# Patient Record
Sex: MALE | URBAN - METROPOLITAN AREA
[De-identification: names, ages, dates, MRNs, and addresses within clinical notes are randomized per-mention and may not be internally consistent; named-entity substitution may affect disease eponyms.]

---

## 2021-08-30 ENCOUNTER — APPOINTMENT (RX ONLY)
Dept: URBAN - METROPOLITAN AREA CLINIC 35 | Facility: CLINIC | Age: 36
Setting detail: DERMATOLOGY
End: 2021-08-30

## 2021-08-30 DIAGNOSIS — E85.4 ORGAN-LIMITED AMYLOIDOSIS: ICD-10-CM | Status: INADEQUATELY CONTROLLED

## 2021-08-30 PROCEDURE — ? DIAGNOSIS COMMENT

## 2021-08-30 PROCEDURE — ? PRESCRIPTION

## 2021-08-30 PROCEDURE — 99203 OFFICE O/P NEW LOW 30 MIN: CPT

## 2021-08-30 PROCEDURE — ? COUNSELING

## 2021-08-30 PROCEDURE — ? PRESCRIPTION MEDICATION MANAGEMENT

## 2021-08-30 RX ORDER — TRIAMCINOLONE ACETONIDE 1 MG/G
CREAM TOPICAL BID
Qty: 45 | Refills: 1 | Status: ERX | COMMUNITY
Start: 2021-08-30

## 2021-08-30 RX ORDER — CLOBETASOL PROPIONATE 0.5 MG/G
CREAM TOPICAL BID
Qty: 45 | Refills: 1 | Status: ERX | COMMUNITY
Start: 2021-08-30

## 2021-08-30 RX ADMIN — TRIAMCINOLONE ACETONIDE: 1 CREAM TOPICAL at 00:00

## 2021-08-30 RX ADMIN — CLOBETASOL PROPIONATE: 0.5 CREAM TOPICAL at 00:00

## 2021-08-30 ASSESSMENT — LOCATION ZONE DERM
LOCATION ZONE: NECK
LOCATION ZONE: LEG

## 2021-08-30 ASSESSMENT — LOCATION DETAILED DESCRIPTION DERM
LOCATION DETAILED: LEFT DISTAL PRETIBIAL REGION
LOCATION DETAILED: RIGHT PROXIMAL PRETIBIAL REGION
LOCATION DETAILED: LEFT INFERIOR POSTERIOR NECK

## 2021-08-30 ASSESSMENT — LOCATION SIMPLE DESCRIPTION DERM
LOCATION SIMPLE: LEFT PRETIBIAL REGION
LOCATION SIMPLE: RIGHT PRETIBIAL REGION
LOCATION SIMPLE: POSTERIOR NECK

## 2021-08-30 NOTE — PROCEDURE: DIAGNOSIS COMMENT
Comment: No involvement of tongue or throat
Detail Level: Simple
Render Risk Assessment In Note?: no

## 2021-08-30 NOTE — PROCEDURE: PRESCRIPTION MEDICATION MANAGEMENT
Samples Given: CeraVe Anti Itch cream
Detail Level: Zone
Render In Strict Bullet Format?: No
Initiate Treatment: Clobetasol 0.05% cream BID x 2 weeks then triamcinolone 0.1% cream BID x 2 weeks

## 2021-11-03 ENCOUNTER — APPOINTMENT (RX ONLY)
Dept: URBAN - METROPOLITAN AREA CLINIC 35 | Facility: CLINIC | Age: 36
Setting detail: DERMATOLOGY
End: 2021-11-03

## 2021-11-03 DIAGNOSIS — E85.4 ORGAN-LIMITED AMYLOIDOSIS: ICD-10-CM | Status: IMPROVED

## 2021-11-03 PROCEDURE — 99213 OFFICE O/P EST LOW 20 MIN: CPT

## 2021-11-03 PROCEDURE — ? PRESCRIPTION

## 2021-11-03 PROCEDURE — ? PRESCRIPTION MEDICATION MANAGEMENT

## 2021-11-03 PROCEDURE — ? COUNSELING

## 2021-11-03 RX ORDER — CLOBETASOL PROPIONATE 0.5 MG/G
CREAM TOPICAL BID
Qty: 45 | Refills: 1 | Status: ERX

## 2021-11-03 RX ORDER — PIMECROLIMUS 10 MG/G
CREAM TOPICAL
Qty: 60 | Refills: 1 | Status: ERX | COMMUNITY
Start: 2021-11-03

## 2021-11-03 RX ORDER — TRIAMCINOLONE ACETONIDE 1 MG/G
CREAM TOPICAL BID
Qty: 45 | Refills: 1 | Status: ERX

## 2021-11-03 RX ADMIN — PIMECROLIMUS: 10 CREAM TOPICAL at 00:00

## 2021-11-03 ASSESSMENT — LOCATION DETAILED DESCRIPTION DERM
LOCATION DETAILED: LEFT INFERIOR POSTERIOR NECK
LOCATION DETAILED: RIGHT PROXIMAL PRETIBIAL REGION
LOCATION DETAILED: LEFT DISTAL PRETIBIAL REGION

## 2021-11-03 ASSESSMENT — LOCATION SIMPLE DESCRIPTION DERM
LOCATION SIMPLE: LEFT PRETIBIAL REGION
LOCATION SIMPLE: POSTERIOR NECK
LOCATION SIMPLE: RIGHT PRETIBIAL REGION

## 2021-11-03 ASSESSMENT — LOCATION ZONE DERM
LOCATION ZONE: LEG
LOCATION ZONE: NECK

## 2021-11-03 NOTE — PROCEDURE: PRESCRIPTION MEDICATION MANAGEMENT
Continue Regimen: For severe itching: Clobetasol 0.05% cream BID x 2-3 weeks at a time then repeat PRN\\n\\nFor moderate itching uncontrolled with Elidel: triamcinolone 0.1% cream BID x 2 weeks and repeat regimen as needed PRN
Detail Level: Zone
Render In Strict Bullet Format?: No
Initiate Treatment: -Moisturize with aveeno eczema, CeraVe itch relief cream. Can place moisturizers in fridge.\\n\\n-Elidel BID every day prn itch

## 2022-05-17 NOTE — PROCEDURE: COUNSELING
Detail Level: Simple Pt. with CKD stage 4 in the setting of likely Pt. with CKD stage 4 in the setting of likely DM. On review of [facility], it was noted that Scr was 4.44 on 11/5/21. Scr remained elevated at 3.97 on 2/18/22. Scr was elevated at 4.38 on admission. UA was WNL with spot urine TP/CR elevated to 1.3 on admission. Pt. has been told that he will be needing hemodialysis for worsening kidney function and he refused in the past (as per outpatient chart). Received lasix 40 mg IV once in ER. Pt. clinically in vol overload. Agree with starting lasix 40 mg IV BID. Monitor labs and urine output. Avoid any potential nephrotoxins. Dose medications as per eGFR.